# Patient Record
Sex: MALE | Employment: OTHER | URBAN - METROPOLITAN AREA
[De-identification: names, ages, dates, MRNs, and addresses within clinical notes are randomized per-mention and may not be internally consistent; named-entity substitution may affect disease eponyms.]

---

## 2017-01-09 ENCOUNTER — TELEPHONE (OUTPATIENT)
Dept: OTOLARYNGOLOGY | Facility: CLINIC | Age: 32
End: 2017-01-09

## 2017-01-09 NOTE — TELEPHONE ENCOUNTER
CT sinus disc being dropped off by family. [place], [place].  Patient with CF and last seen 2015.  Wondering if can review Ct/ or if should make appt.    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    I asked them to give CT to either myself or Dr Walters's coordinator, Amy, and we'd get it into PACs for review, let them know recommendation.

## 2017-01-19 NOTE — TELEPHONE ENCOUNTER
Dr Walters requests that patient make appointment to review CT from [facility] and discuss his  questions related to Ct and ongoing care.  Last visit date here with ENT was in 11/2015.  [place]/ Gerry [phone] to be contacted with this information, will be relayed via our [facility] .